# Patient Record
Sex: FEMALE | Race: WHITE | Employment: UNEMPLOYED | URBAN - METROPOLITAN AREA
[De-identification: names, ages, dates, MRNs, and addresses within clinical notes are randomized per-mention and may not be internally consistent; named-entity substitution may affect disease eponyms.]

---
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
## 2023-08-07 NOTE — PROGRESS NOTES
Treatment Plan Evaluation     Patient: Arabella Turpin   MRN: [id number]  :2000    Age: 18 year old    Sex:female    Date: 19   Time: 1108      Problem/Need List:   Depressive Symptoms, Addiction/Substance Abuse and Disrupted Family Function       Narrative Summary Update of Status and Plan:  Patient returned today after discharge from  this am.  Her affect was WNL.  Therapist will call mother to make referral for [facility] , PCA and behavioral analyst.  Systemic family therapy has been discussed but the out of pocket expense would be high.  [facility] or tutoring may be a good referral for school to support her academic needs.  May need to refer family to business office to look into getting funding for services with [facility].    Medication Evaluation:  Current Outpatient Medications   Medication Sig     levonorgestrel (PB) 13.5 MG IUD 1 each by Intrauterine route once     lithium ER (LITHOBID) 300 MG CR tablet Take 300 mg by mouth every morning      lithium ER (LITHOBID) 300 MG CR tablet Take 600 mg by mouth At Bedtime     lurasidone (LATUDA) 20 MG TABS tablet Take 40 mg by mouth every evening     Omega-3 Fatty Acids (OMEGA 3 PO) Take 1 g by mouth every morning      omeprazole (PRILOSEC) 40 MG DR capsule Take 40 mg by mouth every morning      QUEtiapine (SEROQUEL) 100 MG tablet Take 1 tablet (100 mg) by mouth At Bedtime     SUMAtriptan (IMITREX) 50 MG tablet Take 1 tablet (50 mg) by mouth at onset of headache for migraine May repeat in 2 hours. Max 4 tablets/24 hours.     traZODone (DESYREL) 50 MG tablet Take 1 tablet (50 mg) by mouth At Bedtime     vitamin B-Complex Take 1 tablet by mouth every evening     vitamin D3 (CHOLECALCIFEROL) 1000 units (25 mcg) tablet Take 1,000 Units by mouth every evening     No current facility-administered medications for this encounter.      Monitor current  medications    Physical Health:  Problem(s)/Plan:  No complaints      Legal Court:  Status /Plan:  Voluntary    Projected Length of Stay:  Possible discharge 6/13/19 depending on getting outpt services set up    Contributed to/Attended by:  Marcelina Ross MA [facility], Yesenia Mauro RN          Low Dose Naltrexone Counseling- I discussed with the patient the potential risks and side effects of low dose naltrexone including but not limited to: more vivid dreams, headaches, nausea, vomiting, abdominal pain, fatigue, dizziness, and anxiety.